# Patient Record
(demographics unavailable — no encounter records)

---

## 2025-02-15 NOTE — HISTORY OF PRESENT ILLNESS
[N] : Patient reports normal menses [Y] : Positive pregnancy history [Menarche Age: ____] : age at menarche was [unfilled] [Currently Active] : currently active [Men] : men [PapSmeardate] : 02/28/24 [TextBox_31] : NEG [ColonoscopyDate] : NEVER [HIVDate] : 08/31/22 [TextBox_53] : NEG [SyphilisDate] : 08/31/22 [TextBox_58] : NEG [GonorrheaDate] : 05/10/23 [TextBox_63] : NEG [ChlamydiaDate] : 05/10/23 [TextBox_68] : NEG [HPVDate] : 02/28/24 [TextBox_78] : NEG [HepatitisBDate] : 08/31/22 [TextBox_83] : NEG [HepatitisCDate] : 08/31/22 [TextBox_88] : NEG [LMPDate] : 01/29/25 [PGHxTotal] : 3 [[facility]] : 3 [[facility]iving] : 3 [FreeTextEntry1] : 01/29/25

## 2025-02-15 NOTE — END OF VISIT
[FreeTextEntry3] : I, Musa Lynn solely acted as scribe for Dr. Heaven Ferreira on 02/12/2025  All medical entries made by the Scribe were at my, Dr. Urban, direction and personally dictated by me on 02/12/2025 . I have reviewed the chart and agree that the record accurately reflects my personal performance of the history, physical exam, assessment and plan. I have also personally directed, reviewed, and agreed with the chart.

## 2025-02-15 NOTE — PLAN
[FreeTextEntry1] : We discussed that her abnormal bleeding, although nit heavy since ablation, could still be hormonal.  Will send Lo Loestrin Fe for period control. If not covered, will send nuvaring. Prescription for birth control pills were electronically sent to the pharmacy. She has no contraindications to birth control pill use. Instructions on pill use, precautions, and side effects were discussed in detail.   F/u annually or as needed.

## 2025-06-03 NOTE — PHYSICAL EXAM
[Chaperoned Physical Exam] : A chaperone was present in the examining room during all aspects of the physical examination. [MA] : MA [Appropriately responsive] : appropriately responsive [Alert] : alert [No Acute Distress] : no acute distress [Soft] : soft [Non-tender] : non-tender [Non-distended] : non-distended [Oriented x3] : oriented x3 [Examination Of The Breasts] : a normal appearance [No Discharge] : no discharge [No Masses] : no breast masses were palpable [Labia Majora] : normal [Labia Minora] : normal [No Bleeding] : There was no active vaginal bleeding [Normal] : normal [Uterine Adnexae] : non-palpable

## 2025-06-03 NOTE — PLAN
[FreeTextEntry1] : Will try progesterone only birth control secondary to prolonged spotting. Prescription for birth control pills were electronically sent to the pharmacy. She has no contraindications to birth control pill use. Instructions on pill use, precautions, and side effects were discussed in detail. She is aware that the birth control pill is not perfect for preventing pregnancy even if she is compliant with taking the pill and therefore, she needs condoms for back up. She was also made aware that the birth control pill does not protect her against sexual transmitted diseases, and she still requires condom use. Questions answered.  Failed ablation and medication management of her abnormal bleeding. Will schedule a surgical consult with Dr. Lynn to discuss definitive therapy.   Pap deferred until 2027 per ASCCP guidelines.  Self-breast exam reviewed.  She will follow up annually and as needed.

## 2025-06-03 NOTE — HISTORY OF PRESENT ILLNESS
[N] : Patient reports normal menses [Tubal Occlusion] : has had a tubal occlusion [Y] : Positive pregnancy history [Menarche Age: ____] : age at menarche was [unfilled] [No] : Patient does not have concerns regarding sex [Currently Active] : currently active [Men] : men [PapSmeardate] : 2/28/2024 [TextBox_31] : NEG [GonorrheaDate] : 5/10/2023 [TextBox_63] : NEG [HPVDate] : 2/28/2024 [TextBox_78] : NEG [LMPDate] : 5/13/2025/13/2025 [PGHxTotal] : 3 [[facility] Center[facility]Term] : 3 [[facility]] : 3 [FreeTextEntry1] : 5/13/2025

## 2025-06-03 NOTE — HISTORY OF PRESENT ILLNESS
[N] : Patient reports normal menses [Tubal Occlusion] : has had a tubal occlusion [Y] : Positive pregnancy history [Menarche Age: ____] : age at menarche was [unfilled] [No] : Patient does not have concerns regarding sex [Currently Active] : currently active [Men] : men [PapSmeardate] : 2/28/2024 [TextBox_31] : NEG [GonorrheaDate] : 5/10/2023 [TextBox_63] : NEG [HPVDate] : 2/28/2024 [TextBox_78] : NEG [LMPDate] : 5/13/2025/13/2025 [PGHxTotal] : 3 [[facility] Center[facility]Term] : 3 [[facility]iving] : 3 [FreeTextEntry1] : 5/13/2025

## 2025-06-03 NOTE — END OF VISIT
[FreeTextEntry3] : I, Musa Lynn solely acted as scribe for Dr. Heaven Ferreira on 06/03/2025  All medical entries made by the Scribe were at my, Dr. Urban, direction and personally dictated by me on 06/03/2025 . I have reviewed the chart and agree that the record accurately reflects my personal performance of the history, physical exam, assessment and plan. I have also personally directed, reviewed, and agreed with the chart.

## 2025-06-26 NOTE — DISCUSSION/SUMMARY
[FreeTextEntry1] : 39 y/o P3 with persistent but improved AUB after endometrial ablation, interested in pursuing hysterectomy to resolve bleeding. - Counseled on TLH as above, pt wishes to proceed. - EMB benign last year and pt <40 and without risk factors for endometrial carcinoma, no need to repeat EMB prior to procedure - Task sent to schedule TLH, cystoscopy (b/l tubes should already be absent). - No medical clearance - Return post-op  Jhonny Lynn MD

## 2025-06-26 NOTE — COUNSELING
[TextEntry] : I sat down with the patient to discuss the imaging findings & her symptoms which warrant surgical intervention. I explained that her symptoms are likely related to her uterine pathology. She has failed medical interventions and desires hysterectomy. We discussed recommendation for removal of the cervix as part of hysterectomy. I recommend definitive hysterectomy via a minimally invasive approach.   The options for surgical approach including open, vaginal, and laparoscopic with or without robotic assistance were discussed and the patient agrees with plan for laparoscopic hysterectomy. The differential diagnosis was discussed in detail. The indications, risks, benefits and alternatives were discussed. Including but not limited to conversion to laparotomy, bleeding, infection, injury to surrounding organs was discussed at length. Chance of occult injury and need for future surgery. Patient expressed an understanding of the treatment rationale and her questions were answered to her apparent satisfaction.

## 2025-06-26 NOTE — PHYSICAL EXAM
[Chaperoned Physical Exam] : A chaperone was present in the examining room during all aspects of the physical examination. [Appropriately responsive] : appropriately responsive [Alert] : alert [No Acute Distress] : no acute distress [Oriented x3] : oriented x3 [Labia Majora] : normal [Labia Minora] : normal [Normal] : normal [FreeTextEntry6] : Normal sized mobile uterus, no adnexal masses

## 2025-06-26 NOTE — HISTORY OF PRESENT ILLNESS
[Y] : Positive pregnancy history [Menarche Age: ____] : age at menarche was [unfilled] [No] : Patient does not have concerns regarding sex [PapSmeardate] : 02/28/24 [TextBox_31] : NEG [HIVDate] : 8/31/22 [TextBox_53] : NEG [SyphilisDate] : 8/31/22 [TextBox_58] : NEG [GonorrheaDate] : 5/10/23 [TextBox_63] : NEG [ChlamydiaDate] : 5/10/23 [TextBox_68] : NEG [HPVDate] : 2/28/24 [TextBox_78] : NEG [LMPDate] : 06/14/25 [PGHxTotal] : 3 [[facility]Term] : 3 [[facility]iving] : 3 [FreeTextEntry1] : 06/14/25

## 2025-06-26 NOTE — HISTORY OF PRESENT ILLNESS
[Y] : Positive pregnancy history [Menarche Age: ____] : age at menarche was [unfilled] [No] : Patient does not have concerns regarding sex [PapSmeardate] : 02/28/24 [TextBox_31] : NEG [HIVDate] : 8/31/22 [TextBox_53] : NEG [SyphilisDate] : 8/31/22 [TextBox_58] : NEG [GonorrheaDate] : 5/10/23 [TextBox_63] : NEG [ChlamydiaDate] : 5/10/23 [TextBox_68] : NEG [HPVDate] : 2/28/24 [TextBox_78] : NEG [LMPDate] : 06/14/25 [PGHxTotal] : 3 [[facility]Term] : 3 [[facility] Hospitaliving] : 3 [FreeTextEntry1] : 06/14/25